# Patient Record
(demographics unavailable — no encounter records)

---

## 2024-11-09 NOTE — ASSESSMENT
[FreeTextEntry1] : Cervical sprain/strain  Plan Tylenol, the heating pad and recommending a course of conservative treatment physical therapy.  If does not respond to physical therapy he may require an MRI.   He should follow-up in 4 weeks.  I am also giving a prescription for a muscle relaxant cyclobenzaprine  I did advise him to follow-up with his doctors treating him for his concussion and eye problems.  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  Patient was advised if they develop any severe pain, numbness or tingling or pain with range of motion to the fingers they must call or go to the emergency room immediately. All the signs and symptoms of compartment syndrome were explained.  The patient understands.  This medical record was created utilizing Dragon voice recognition software.  This software is not perfect and may occasionally create typographical errors which may be reflected in the above medical record.
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
## 2024-11-09 NOTE — IMAGING
[de-identified] : He is alert and oriented vital signs are stable.  He is able to stand on his toes and his heels  Examination of the cervical spine reveals tenderness palpation left paraspinal paracervical region trapezial region with some mild spasm.  He is able to forward flex to 30 degrees extension of the neck of 40 degrees lateral rotation of 60 degrees and lateral flexion of 35 degrees.  5-5 strength bilateral upper extremities 2+ deep tendon reflexes throughout  He had a normal neurologic exam. Normal vascular exam. Normal skin exam. No evidence for open wounds infection or compartment syndrome. [FreeTextEntry1] : X-ray cervical spine 2 views with no fracture or dislocations.  There was some loss of the normal cervical lordosis